# Patient Record
Sex: FEMALE | Race: OTHER | Employment: OTHER | URBAN - METROPOLITAN AREA
[De-identification: names, ages, dates, MRNs, and addresses within clinical notes are randomized per-mention and may not be internally consistent; named-entity substitution may affect disease eponyms.]

---

## 2019-05-20 ENCOUNTER — HOSPITAL ENCOUNTER (OUTPATIENT)
Age: 34
Discharge: OTHER INSTITUTION | End: 2019-05-20
Payer: COMMERCIAL

## 2019-05-20 VITALS
HEART RATE: 86 BPM | OXYGEN SATURATION: 99 % | TEMPERATURE: 99 F | SYSTOLIC BLOOD PRESSURE: 111 MMHG | BODY MASS INDEX: 17.37 KG/M2 | RESPIRATION RATE: 20 BRPM | DIASTOLIC BLOOD PRESSURE: 69 MMHG | WEIGHT: 92 LBS | HEIGHT: 61 IN

## 2019-05-20 DIAGNOSIS — E04.9 ENLARGED THYROID: Primary | ICD-10-CM

## 2019-05-20 PROCEDURE — 87430 STREP A AG IA: CPT

## 2019-05-20 PROCEDURE — 99202 OFFICE O/P NEW SF 15 MIN: CPT

## 2019-05-20 NOTE — ED PROVIDER NOTES
Patient presents with:  Sore Throat      HPI:     Madalyn Catherine is a 29year old female who presents for evaluation of a chief complaint of swelling over the thyroid gland that she noticed approximately 2 to 3 weeks ago.   She states she started having some disc connections:        Talks on phone: Not on file        Gets together: Not on file        Attends [employer] service: Not on file        Active member of club or organization: Not on file        Attends meetings of clubs or organizations: Not on file with an OB/GYN doctor next week at [facility]. She states she called to find out if they did thyroid testing and they said they do. Based on her symptoms, we discussed that I would recommend she go to the emergency department for evaluation.